# Patient Record
Sex: FEMALE | NOT HISPANIC OR LATINO | URBAN - METROPOLITAN AREA
[De-identification: names, ages, dates, MRNs, and addresses within clinical notes are randomized per-mention and may not be internally consistent; named-entity substitution may affect disease eponyms.]

---

## 2019-01-14 NOTE — PROCEDURE NOTE: CLINICAL
PROCEDURE NOTE: Removal of Conjunctival FB, Superficial #1 Left Upper Lid. Anesthesia: Topical. Prep: Antibiotic Drops q 5min x 3. Prior to treatment, the risks/benefits/alternatives were discussed. The patient wished to proceed with procedure. Superficial conjunctival FB removed with forcep/needle. Globe and conjunctiva intact. Patient tolerated procedure well. There were no complications. Post procedure instructions given. Harry Mathew

## 2019-03-04 ENCOUNTER — IMPORTED ENCOUNTER (OUTPATIENT)
Dept: URBAN - METROPOLITAN AREA CLINIC 1 | Facility: CLINIC | Age: 49
End: 2019-03-04

## 2019-03-04 PROBLEM — H52.4: Noted: 2019-03-04

## 2019-03-04 PROCEDURE — S0620 ROUTINE OPHTHALMOLOGICAL EXA: HCPCS

## 2019-03-04 NOTE — PATIENT DISCUSSION
1. Presbyopia -- Rx was given for corrective spectacles if indicated. Return for an appointment in 1 year for a 36 with Dr. Cruz Kim.

## 2020-06-06 ENCOUNTER — IMPORTED ENCOUNTER (OUTPATIENT)
Dept: URBAN - METROPOLITAN AREA CLINIC 1 | Facility: CLINIC | Age: 50
End: 2020-06-06

## 2020-06-06 PROBLEM — H52.4: Noted: 2020-06-06

## 2020-06-06 PROCEDURE — S0621 ROUTINE OPHTHALMOLOGICAL EXA: HCPCS

## 2020-06-06 NOTE — PATIENT DISCUSSION
1.  Presbyopia -- Rx was given for corrective spectacles if indicated. Return for an appointment in 1 year for a 36 with Dr. Radha Donis.

## 2020-07-21 NOTE — PROCEDURE NOTE: SURGICAL
<p>Prior to commencing surgery patient identification, surgical procedure, site, and side were confirmed by Dr. Sharma.&nbsp; Following topical proparacaine anesthesia, the patient was positioned at the YAG laser, a contact lens coupled to the cornea of the right eye with methylcellulose and an axial posterior capsulotomy performed without complication using 3.2 Mj x 29. Attention was then turned to the left eye and a contact lens coupled to the cornea of the left eye with methylcellulose and an axial posterior capsulotomy performed without complication using 3.2 Mj x 31. One drop of Alphagan was instilled in both eyes and the patient returned to the holding area having tolerated the procedure well and without complication. </p><p>MRN:[id number]</p>

## 2021-09-13 NOTE — PROCEDURE NOTE: CLINICAL
PROCEDURE NOTE: Avastin () OS. Diagnosis: CME. Anesthesia: Proparacaine 0.5%. Prep: Betadine Drops and Betadine Scrub. Prior to the original injection, risks/benefits/alternatives discussed including infection, loss of vision, hemorrhage, cataract, glaucoma, retinal tears or detachment. A written consent is on file, and the need for today’s injection was discussed and the patient is understanding and wishes to proceed. The off-label status of Intravitreal Avastin also was reviewed. The patient wished to proceed with treatment. The patient wished to proceed with treatment. Topical anesthetic drops were applied to the eye. Betadine prep was performed. Surgical mask worn. Sterile drape and lid speculum were applied. Using the syringe provided, Avastin 1.25 mg in 0.05 cc was injected into the vitreous cavity. Injection site: 3-4 mm from the limbus. Patient tolerated procedure well. Following the intravitreal injection, the sterile lid speculum was removed. CRA perfusion confirmed. CF vision checked. Patient given office phone number/answering service number and advised to call immediately should there be an increase in floaters or redness, loss of vision or pain, or should they have any other questions or concerns. Pamela Askew

## 2021-09-30 ENCOUNTER — IMPORTED ENCOUNTER (OUTPATIENT)
Dept: URBAN - METROPOLITAN AREA CLINIC 1 | Facility: CLINIC | Age: 51
End: 2021-09-30

## 2021-09-30 PROBLEM — H52.4: Noted: 2021-09-30

## 2021-09-30 PROCEDURE — S0621 ROUTINE OPHTHALMOLOGICAL EXA: HCPCS

## 2021-09-30 NOTE — PATIENT DISCUSSION
1.  Presbyopia OU -- Rx was given for corrective spectacles if indicated. 2. Dry Eyes OU -- Recommend ATs BID OU routinely. (Sample given of Refresh) Return for an appointment in 1 year 36 with Dr. Cruz Kim.

## 2022-04-02 ASSESSMENT — VISUAL ACUITY
OS_CC: J1+
OS_CC: J1
OS_SC: 20/20
OS_CC: 20/20
OD_CC: 20/20
OS_SC: 20/20
OD_SC: 20/20
OD_CC: 20/20
OD_SC: 20/20-1
OD_CC: J1+
OD_CC: J1
OD_CC: J1+
OS_CC: J1+
OS_CC: 20/20-1

## 2022-04-02 ASSESSMENT — TONOMETRY
OS_IOP_MMHG: 17
OD_IOP_MMHG: 16
OD_IOP_MMHG: 17
OS_IOP_MMHG: 16
OS_IOP_MMHG: 16
OD_IOP_MMHG: 16

## 2023-04-05 NOTE — PATIENT DISCUSSION
Patient understands condition, prognosis and need for follow up care. FAMILY HISTORY:  No pertinent family history in first degree relatives

## 2023-05-15 ENCOUNTER — COMPREHENSIVE EXAM (OUTPATIENT)
Dept: URBAN - METROPOLITAN AREA CLINIC 1 | Facility: CLINIC | Age: 53
End: 2023-05-15

## 2023-05-15 DIAGNOSIS — H52.4: ICD-10-CM

## 2023-05-15 PROCEDURE — 92015 DETERMINE REFRACTIVE STATE: CPT

## 2023-05-15 PROCEDURE — 92014 COMPRE OPH EXAM EST PT 1/>: CPT

## 2023-05-15 ASSESSMENT — TONOMETRY
OS_IOP_MMHG: 15
OD_IOP_MMHG: 15

## 2023-06-08 NOTE — PATIENT DISCUSSION
Control BP to avoid ocular complications. 

## 2023-06-16 ENCOUNTER — COMPREHENSIVE EXAM (OUTPATIENT)
Dept: URBAN - METROPOLITAN AREA CLINIC 1 | Facility: CLINIC | Age: 53
End: 2023-06-16

## 2023-06-16 DIAGNOSIS — H04.123: ICD-10-CM

## 2023-06-16 DIAGNOSIS — H16.143: ICD-10-CM

## 2023-06-16 PROCEDURE — 92014 COMPRE OPH EXAM EST PT 1/>: CPT

## 2023-06-16 PROCEDURE — 83861 MICROFLUID ANALY TEARS: CPT

## 2023-06-16 RX ORDER — LIFITEGRAST 50 MG/ML: 1 SOLUTION/ DROPS OPHTHALMIC TWICE A DAY

## 2023-06-16 ASSESSMENT — TONOMETRY
OD_IOP_MMHG: 15
OS_IOP_MMHG: 15

## 2023-06-16 ASSESSMENT — VISUAL ACUITY
OS_SC: 20/20
OD_SC: 20/25

## 2025-06-13 ENCOUNTER — COMPREHENSIVE EXAM (OUTPATIENT)
Age: 55
End: 2025-06-13

## 2025-06-13 DIAGNOSIS — H16.143: ICD-10-CM

## 2025-06-13 DIAGNOSIS — E11.9: ICD-10-CM

## 2025-06-13 DIAGNOSIS — H04.123: ICD-10-CM

## 2025-06-13 DIAGNOSIS — H52.4: ICD-10-CM

## 2025-06-13 DIAGNOSIS — H25.813: ICD-10-CM

## 2025-06-13 PROCEDURE — 92015 DETERMINE REFRACTIVE STATE: CPT | Mod: NC

## 2025-06-13 PROCEDURE — 92014 COMPRE OPH EXAM EST PT 1/>: CPT

## 2025-06-13 PROCEDURE — 92250 FUNDUS PHOTOGRAPHY W/I&R: CPT
